# Patient Record
Sex: MALE | Race: AMERICAN INDIAN OR ALASKA NATIVE
[De-identification: names, ages, dates, MRNs, and addresses within clinical notes are randomized per-mention and may not be internally consistent; named-entity substitution may affect disease eponyms.]

---

## 2019-11-08 ENCOUNTER — HOSPITAL ENCOUNTER (EMERGENCY)
Dept: HOSPITAL 5 - ED | Age: 64
Discharge: HOME | End: 2019-11-08
Payer: SELF-PAY

## 2019-11-08 VITALS — DIASTOLIC BLOOD PRESSURE: 78 MMHG | SYSTOLIC BLOOD PRESSURE: 110 MMHG

## 2019-11-08 DIAGNOSIS — Z79.899: ICD-10-CM

## 2019-11-08 DIAGNOSIS — K04.7: Primary | ICD-10-CM

## 2019-11-08 NOTE — EMERGENCY DEPARTMENT REPORT
ED ENT HPI





- General


Chief complaint: Skin/Abscess/Foreign Body


Stated complaint: MOUTH INFECTED


Time Seen by Provider: 11/08/19 16:09


Source: patient


Mode of arrival: Ambulatory


Limitations: No Limitations





- History of Present Illness


Initial comments: 


This is a 64 y.o. M. that presents to the ER with left sided facial swelling and

dental pain for 5 days. there is no throat or ear pain , dental pain is 5/10 

aching, no facial swelling mild gum swelling. pt is tolerating po intake 











- Related Data


                                  Previous Rx's











 Medication  Instructions  Recorded  Last Taken  Type


 


Amoxicillin [Trimox CAP] 500 mg PO Q8H 10 Days #30 capsule 11/08/19 Unknown Rx


 


Chlorhexidine Mouthwash [Peridex] 15 ml MM BID #1 bottle 11/08/19 Unknown Rx


 


traMADol [Ultram] 50 mg PO Q6HR PRN #12 tablet 11/08/19 Unknown Rx











                                    Allergies











Allergy/AdvReac Type Severity Reaction Status Date / Time


 


No Known Allergies Allergy   Unverified 11/08/19 15:43














ED Dental HPI





- General


Chief complaint: Skin/Abscess/Foreign Body


Stated complaint: MOUTH INFECTED


Time Seen by Provider: 11/08/19 16:09


Source: patient


Mode of arrival: Ambulatory


Limitations: No Limitations





- Related Data


                                  Previous Rx's











 Medication  Instructions  Recorded  Last Taken  Type


 


Amoxicillin [Trimox CAP] 500 mg PO Q8H 10 Days #30 capsule 11/08/19 Unknown Rx


 


Chlorhexidine Mouthwash [Peridex] 15 ml MM BID #1 bottle 11/08/19 Unknown Rx


 


traMADol [Ultram] 50 mg PO Q6HR PRN #12 tablet 11/08/19 Unknown Rx











                                    Allergies











Allergy/AdvReac Type Severity Reaction Status Date / Time


 


No Known Allergies Allergy   Unverified 11/08/19 15:43














ED Review of Systems


ROS: 


Stated complaint: MOUTH INFECTED


Other details as noted in HPI





Constitutional: denies: chills, fever


Eyes: denies: eye pain, eye discharge, vision change


ENT: dental pain.  denies: ear pain, throat pain


Respiratory: denies: cough, shortness of breath, wheezing


Cardiovascular: denies: chest pain, palpitations


Endocrine: no symptoms reported


Gastrointestinal: denies: abdominal pain, nausea, diarrhea


Genitourinary: denies: urgency, dysuria


Musculoskeletal: denies: back pain, joint swelling, arthralgia


Skin: denies: rash, lesions


Neurological: denies: headache, weakness, paresthesias


Psychiatric: denies: anxiety, depression


Hematological/Lymphatic: denies: easy bleeding, easy bruising





ED Past Medical Hx





- Past Medical History


Previous Medical History?: No





- Surgical History


Past Surgical History?: No





- Social History


Smoking Status: Never Smoker


Substance Use Type: None





- Medications


Home Medications: 


                                Home Medications











 Medication  Instructions  Recorded  Confirmed  Last Taken  Type


 


Amoxicillin [Trimox CAP] 500 mg PO Q8H 10 Days #30 capsule 11/08/19  Unknown Rx


 


Chlorhexidine Mouthwash [Peridex] 15 ml MM BID #1 bottle 11/08/19  Unknown Rx


 


traMADol [Ultram] 50 mg PO Q6HR PRN #12 tablet 11/08/19  Unknown Rx














ED Physical Exam





- General


Limitations: No Limitations


General appearance: alert, in no apparent distress





- Head


Head exam: Present: atraumatic, normocephalic





- Eye


Eye exam: Present: normal appearance





- ENT


ENT exam: Present: mucous membranes moist, TM's normal bilaterally, normal 

external ear exam





- Expanded ENT Exam


  ** Expanded


Mouth exam: Absent: trismus


Teeth exam: Present: dental caries, dental tenderness # (21 abscess small, no 

drainage mild erythema gum swelling )


Throat exam: Positive: other (uvual midline no swelling no trismus no stridor no

 exudate no lesions).  Negative: tonsillar erythema, tonsillomegaly, tonsillar 

exudate, R peritonsillar mass, L peritonsillar mass





- Neck


Neck exam: Present: normal inspection, full ROM.  Absent: tenderness, 

lymphadenopathy





- Respiratory


Respiratory exam: Present: normal lung sounds bilaterally.  Absent: respiratory 

distress, wheezes, stridor, chest wall tenderness





- Cardiovascular


Cardiovascular Exam: Present: regular rate, normal rhythm, normal heart sounds. 

 Absent: systolic murmur, diastolic murmur, rubs, gallop





- GI/Abdominal


GI/Abdominal exam: Present: soft, normal bowel sounds





- Rectal


Rectal exam: Present: deferred





- Extremities Exam


Extremities exam: Present: normal inspection





- Back Exam


Back exam: Present: normal inspection, full ROM.  Absent: tenderness





- Neurological Exam


Neurological exam: Present: alert, oriented X3





- Psychiatric


Psychiatric exam: Present: normal affect, normal mood





- Skin


Skin exam: Present: warm, dry, intact, normal color.  Absent: rash





ED Course





                                   Vital Signs











  11/08/19





  15:44


 


Temperature 98.7 F


 


Pulse Rate 106 H


 


Respiratory 16





Rate 


 


Blood Pressure 110/78


 


O2 Sat by Pulse 98





Oximetry 














- I & D


  ** Lower Jaw


Type of Procedure: Simple


Site: dental abscess #21


Blade Size: 18g needle 


Progress: 


small dental abscess, I&D with 18g needle aspiration moderate purulent drainage,

 gauzge dress all bleeding is controlled, pain is relieve, pt given after care 

instructions will follow up with dentist in 1-2 days. pt declined dental block ,

 tolerated procedure with minimal distress. 








ED Medical Decision Making





- Medical Decision Making


pt for dental abscess I&D see procedure note, all bleeding is controlled , pt 

given after care instructions will follow up with dentist in 1-2 days, dc to 

home with rx for amoxicillin, ultram, peridex , pt dc'd in stable condition at 

this time.





Critical care attestation.: 


If time is entered above; I have spent that time in minutes in the direct care 

of this critically ill patient, excluding procedure time.








ED Disposition


Clinical Impression: 


 Dental abscess





Disposition: DC-01 TO HOME OR SELFCARE


Is pt being admited?: No


Does the pt Need Aspirin: No


Condition: Stable


Instructions:  Dental Abscess (ED)


Prescriptions: 


Chlorhexidine Mouthwash [Peridex] 15 ml MM BID #1 bottle


Amoxicillin [Trimox CAP] 500 mg PO Q8H 10 Days #30 capsule


traMADol [Ultram] 50 mg PO Q6HR PRN #12 tablet


 PRN Reason: Pain


Referrals: 


[facility] [Outside] - 3-5 Days


Forms:  Work/School Release Form(ED)


Time of Disposition: 19:27

## 2019-11-08 NOTE — EVENT NOTE
ED Screening Note


Date of service: 11/08/19


Time: 16:09


ED Screening Note: 





This is a 64 y.o. M. that presents to the ER with left sided facial swelling and

dental pain for 5 days.





This initial assessment/diagnostic orders/clinical plan/treatment(s) is/are 

subject to change based on patients health status, clinical progression and re-

assessment by fellow clinical providers in the ED. Further treatment and workup 

at subsequent clinical providers discretion. Patient/guardian urged not to elope

from the ED as their condition may be serious if not clinically assessed and 

managed. 





Initial orders include: